# Patient Record
Sex: MALE | Race: WHITE
[De-identification: names, ages, dates, MRNs, and addresses within clinical notes are randomized per-mention and may not be internally consistent; named-entity substitution may affect disease eponyms.]

---
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
## 2017-04-04 NOTE — RADRPT
PROCEDURE:   XR Elbow. 

 

CLINICAL INDICATION:   Left elbow pain, concern for foreign body

 

TECHNIQUE:   AP, lateral and oblique  views of the left elbow performed. 

 

COMPARISON:   None. 

 

FINDINGS:

There is no radiographic evidence of acute fracture or dislocation.  There is no significant joint e
ffusion.  The joint spaces are preserved.  The soft tissues are grossly unremarkable. No radiopaque 
foreign body is identified. 

 

IMPRESSION:

1. No radiographic evidence of acute osseous abnormality or significant joint effusion.

2. No radiopaque foreign body identified.

 

RPTAT: UU

_____________________________________________ 

.Jose Luis Bourne MD, MD           Date    Time 

Electronically viewed and signed by .Jose Luis Bourne MD, MD on 04/04/2017 20:13 

 

D:  04/04/2017 20:13  T:  04/04/2017 20:13

.K/

## 2017-08-17 NOTE — ERD
ER Documentation


Chief Complaint


Date/Time


DATE: 8/17/17 


TIME: 23:09


Chief Complaint


headache x 1 day





HPI


27-year-old male coming in complaining of headache 1 day.  Patient denies head 

trauma.  He has not taken medications for symptoms.  Pain feels like a band 

wrapped around his head squeezing.  Denies dizziness.  Denies vomiting.  Has a 

few episodes of nausea.  Denies fever.  Denies neck pain.  Denies chest pain or 

shortness of breath.  Denies vision changes.  Does not describe this as the 

worst pain of his life.





ROS


All systems reviewed and are negative except as per history of present illness.





Medications


Home Meds


Active Scripts


Acetamin/Butalbital/Caffeine* (Fioricet*) [id number] Tab, 1 TAB PO Q6H Y 

for PAIN, #30 TAB


   Prov:TEE POLANCO PA-C         8/17/17


Ibuprofen* (Motrin*) 600 Mg Tab, 600 MG PO Q6, #15 TAB


   Prov:TERRY JONES MD         4/4/17





Allergies


Allergies:  


Coded Allergies:  


     No Known Allergy (Unverified , 8/17/17)





PMhx/Soc


History of Surgery:  No


Anesthesia Reaction:  No


Hx Neurological Disorder:  No


Hx Respiratory Disorders:  No


Hx Cardiac Disorders:  No


Hx Psychiatric Problems:  No


Hx Miscellaneous Medical Probl:  No


Hx Alcohol Use:  No


Hx Substance Use:  No


Hx Tobacco Use:  Yes


Smoking Status:  Light tobacco smoker





Physical Exam


Vitals





Vital Signs








  Date Time  Temp Pulse Resp B/P Pulse Ox O2 Delivery O2 Flow Rate FiO2


 


8/17/17 20:28 98.3 56 18 124/72 97 Room Air  


 


8/17/17 18:37 98.3 64 20 138/92 100   








Physical Exam


GENERAL: The patient is well-appearing, well-nourished, in no acute distress


HEENT: Atraumatic.  Conjunctivae are pink.  Pupils equal, round, and reactive 

to light.  There is no scleral icterus.  Tympanic membranes clear bilaterally.  

Oropharynx clear.  No nystagmus or photophobia.


NECK: C-spine is soft and supple.  There is no meningismus.  There is no 

cervical lymphadenopathy.  No JVD.  No bruits.  No goiter.


CHEST: Clear to auscultation bilaterally.  There are no rales, wheezes or 

rhonchi.


HEART: Regular rate and rhythm.  No murmurs, clicks, rubs or gallops.  No S3 or 

S4.


NEUROLOGIC: Alert and oriented.  Cranial nerves II through XII intact.  Motor 

strength in all 4 extremities with 5 out of 5 strength.  Sensation grossly 

intact.  Normal speech and gait.  Babinski negative.  DTR 2+ throughout.


SKIN: There is no apparent rash or petechiae.  The skin is warm and dry.


Results 24 hrs





 Current Medications








 Medications


  (Trade)  Dose


 Ordered  Sig/Rolf


 Route


 PRN Reason  Start Time


 Stop Time Status Last Admin


Dose Admin


 


 Sodium Chloride


  (NS)  1,000 ml @ 


 1,000 mls/hr  Q1H STAT


 IV


   8/17/17 19:08


 8/17/17 20:07 DC 8/17/17 19:39


 


 


 Ondansetron HCl


  (Zofran Inj)  4 mg  ONCE  STAT


 IV


   8/17/17 19:08


 8/17/17 19:10 DC 8/17/17 19:40


 


 


 Ketorolac


 Tromethamine


  (Toradol)  30 mg  ONCE  STAT


 IV


   8/17/17 19:08


 8/17/17 19:10 DC 8/17/17 19:39


 


 


 Diphenhydramine


 HCl


  (Benadryl)  25 mg  ONCE  STAT


 IV


   8/17/17 19:08


 8/17/17 19:10 DC 8/17/17 19:37


 











Procedures/MDM


ER Course: 1 L of normal saline given in ED.  IV Toradol, IV Benadryl, IV 

Zofran given.  Upon reevaluation patient's headache had resolved.





MDM: 27-year-old male coming in complaining of headache.  I have low suspicion 

for intracranial hemorrhage or mass-effect.  I have low suspicion for 

neurodeficit.  Patient's exam was not concerning.  I did not feel that there is 

indication for CT scan at this time.  Patient's pain resolved with medication 

and fluids.  I have low suspicion for meningitis.  Patient will be discharged 

with pain medication and recommended to follow-up with primary care physician 

within 1-2 days for close evaluation.  All other questions answered time of 

discharge.  Patient is to comply plan.





Departure


Diagnosis:  


 Primary Impression:  


 Headache


Condition:  Stable


Patient Instructions:  Self-Care for Headaches


Referrals:  


[facility]


YOU HAVE RECEIVED A MEDICAL SCREENING EXAM AND THE RESULTS INDICATE THAT YOU DO 

NOT HAVE A CONDITION THAT REQUIRES URGENT TREATMENT IN THE EMERGENCY DEPARTMENT.





FURTHER EVALUATION AND TREATMENT OF YOUR CONDITION CAN WAIT UNTIL YOU ARE SEEN 

IN YOUR DOCTORS OFFICE WITHIN THE NEXT 1-2 DAYS. IT IS YOUR RESPONSIBILITY TO 

MAKE AN APPOINTMENT FOR FOLOW-UP CARE.





IF YOU HAVE A PRIMARY DOCTOR


--you should call your primary doctor and schedule an appointment





IF YOU DO NOT HAVE A PRIMARY DOCTOR YOU CAN CALL OUR PHYSICIAN REFERRAL HOTLINE 

AT


 [phone] 





IF YOU CAN NOT AFFORD TO SEE A PHYSICIAN YOU CAN CHOSE FROM THE FOLLOWING 

[facility]





[facility] [phone] [address] [facility] [phone] [address] [facility] [phone] [address] [facility] [phone] [address] [facility] [phone] [address] [facility]. [facility]. [phone]


[address]





Additional Instructions:  


FOLLOW UP WITH YOUR PRIMARY CARE PHYSICIAN TOMORROW.Return to this facility if 

you are not improving as expected.











TEE POLANCO PA-C Aug 17, 2017 23:12

## 2019-06-08 ENCOUNTER — HOSPITAL ENCOUNTER (EMERGENCY)
Dept: HOSPITAL 91 - FTE | Age: 29
Discharge: HOME | End: 2019-06-08
Payer: COMMERCIAL

## 2019-06-08 ENCOUNTER — HOSPITAL ENCOUNTER (EMERGENCY)
Dept: HOSPITAL 10 - FTE | Age: 29
Discharge: HOME | End: 2019-06-08
Payer: COMMERCIAL

## 2019-06-08 VITALS — DIASTOLIC BLOOD PRESSURE: 82 MMHG | RESPIRATION RATE: 18 BRPM | HEART RATE: 92 BPM | SYSTOLIC BLOOD PRESSURE: 145 MMHG

## 2019-06-08 VITALS
BODY MASS INDEX: 37.65 KG/M2 | HEIGHT: 71 IN | WEIGHT: 268.96 LBS | HEIGHT: 71 IN | BODY MASS INDEX: 37.65 KG/M2 | WEIGHT: 268.96 LBS

## 2019-06-08 DIAGNOSIS — M25.511: ICD-10-CM

## 2019-06-08 DIAGNOSIS — R07.81: ICD-10-CM

## 2019-06-08 DIAGNOSIS — M54.9: ICD-10-CM

## 2019-06-08 DIAGNOSIS — M25.562: ICD-10-CM

## 2019-06-08 DIAGNOSIS — M25.572: ICD-10-CM

## 2019-06-08 DIAGNOSIS — M25.571: Primary | ICD-10-CM

## 2019-06-08 DIAGNOSIS — Z87.891: ICD-10-CM

## 2019-06-08 PROCEDURE — 73562 X-RAY EXAM OF KNEE 3: CPT

## 2019-06-08 PROCEDURE — 72072 X-RAY EXAM THORAC SPINE 3VWS: CPT

## 2019-06-08 PROCEDURE — 96372 THER/PROPH/DIAG INJ SC/IM: CPT

## 2019-06-08 PROCEDURE — 71100 X-RAY EXAM RIBS UNI 2 VIEWS: CPT

## 2019-06-08 PROCEDURE — 73630 X-RAY EXAM OF FOOT: CPT

## 2019-06-08 PROCEDURE — 73610 X-RAY EXAM OF ANKLE: CPT

## 2019-06-08 PROCEDURE — 99284 EMERGENCY DEPT VISIT MOD MDM: CPT

## 2019-06-08 RX ADMIN — KETOROLAC TROMETHAMINE 1 MG: 30 INJECTION, SOLUTION INTRAMUSCULAR at 16:31
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
